# Patient Record
Sex: FEMALE | Race: WHITE | NOT HISPANIC OR LATINO | URBAN - METROPOLITAN AREA
[De-identification: names, ages, dates, MRNs, and addresses within clinical notes are randomized per-mention and may not be internally consistent; named-entity substitution may affect disease eponyms.]

---

## 2020-06-09 ENCOUNTER — OFFICE VISIT (OUTPATIENT)
Dept: URBAN - METROPOLITAN AREA CLINIC 13 | Facility: CLINIC | Age: 30
End: 2020-06-09

## 2020-06-12 ENCOUNTER — OFFICE VISIT (OUTPATIENT)
Dept: URBAN - METROPOLITAN AREA CLINIC 113 | Facility: CLINIC | Age: 30
End: 2020-06-12

## 2020-06-25 ENCOUNTER — OFFICE VISIT (OUTPATIENT)
Dept: URBAN - METROPOLITAN AREA CLINIC 113 | Facility: CLINIC | Age: 30
End: 2020-06-25

## 2020-07-25 ENCOUNTER — TELEPHONE ENCOUNTER (OUTPATIENT)
Dept: URBAN - METROPOLITAN AREA CLINIC 13 | Facility: CLINIC | Age: 30
End: 2020-07-25

## 2020-07-25 RX ORDER — DICYCLOMINE HYDROCHLORIDE 20 MG/1
TAKE 1 TABLET EVERY 6 HOURS PRN ABDOMINAL PAIN TABLET ORAL
Qty: 45 | Refills: 6 | OUTPATIENT
Start: 2020-05-06 | End: 2020-06-25

## 2020-07-25 RX ORDER — SODIUM SULFATE, POTASSIUM SULFATE, MAGNESIUM SULFATE 17.5; 3.13; 1.6 G/ML; G/ML; G/ML
TAKE 1 BOTTLE AT 5:00PM THE DAY BEFORE PROCEDURE. TAKE 2ND BOTTLE 6 HRS PRIOR TO PROCEDURE SOLUTION, CONCENTRATE ORAL
Qty: 1 | Refills: 0 | OUTPATIENT
Start: 2020-05-18 | End: 2020-06-25

## 2020-07-26 ENCOUNTER — TELEPHONE ENCOUNTER (OUTPATIENT)
Dept: URBAN - METROPOLITAN AREA CLINIC 13 | Facility: CLINIC | Age: 30
End: 2020-07-26

## 2020-07-26 RX ORDER — MELOXICAM 15 MG/1
TK 1 T PO QD WC TABLET ORAL
Qty: 30 | Refills: 0 | Status: ACTIVE | COMMUNITY
Start: 2019-11-19

## 2020-07-26 RX ORDER — METHOCARBAMOL 750 MG/1
TK 1 T PO TID TABLET, FILM COATED ORAL
Qty: 60 | Refills: 0 | Status: ACTIVE | COMMUNITY
Start: 2019-08-30

## 2020-07-26 RX ORDER — METHYLPREDNISOLONE 4 MG/1
ALTERNATE PACKS UTD TAT TABLET ORAL
Qty: 42 | Refills: 0 | Status: ACTIVE | COMMUNITY
Start: 2019-07-10

## 2020-07-26 RX ORDER — SODIUM FLUORIDE AND POTASSIUM NITRATE 5.8; 57.5 MG/ML; MG/ML
BRUSH UTD GEL, DENTIFRICE DENTAL
Qty: 100 | Refills: 0 | Status: ACTIVE | COMMUNITY
Start: 2020-06-02

## 2021-06-11 ENCOUNTER — TELEPHONE ENCOUNTER (OUTPATIENT)
Dept: URBAN - METROPOLITAN AREA CLINIC 107 | Facility: CLINIC | Age: 31
End: 2021-06-11

## 2023-03-09 ENCOUNTER — TELEPHONE ENCOUNTER (OUTPATIENT)
Dept: URBAN - METROPOLITAN AREA CLINIC 113 | Facility: CLINIC | Age: 33
End: 2023-03-09

## 2023-04-06 ENCOUNTER — LAB OUTSIDE AN ENCOUNTER (OUTPATIENT)
Dept: URBAN - METROPOLITAN AREA CLINIC 113 | Facility: CLINIC | Age: 33
End: 2023-04-06

## 2023-04-06 ENCOUNTER — DASHBOARD ENCOUNTERS (OUTPATIENT)
Age: 33
End: 2023-04-06

## 2023-04-06 ENCOUNTER — OFFICE VISIT (OUTPATIENT)
Dept: URBAN - METROPOLITAN AREA CLINIC 113 | Facility: CLINIC | Age: 33
End: 2023-04-06
Payer: COMMERCIAL

## 2023-04-06 VITALS
HEIGHT: 65 IN | RESPIRATION RATE: 18 BRPM | BODY MASS INDEX: 23.49 KG/M2 | SYSTOLIC BLOOD PRESSURE: 102 MMHG | HEART RATE: 69 BPM | DIASTOLIC BLOOD PRESSURE: 70 MMHG | WEIGHT: 141 LBS | TEMPERATURE: 97.5 F

## 2023-04-06 DIAGNOSIS — R17 TOTAL BILIRUBIN, ELEVATED: ICD-10-CM

## 2023-04-06 DIAGNOSIS — K59.1 DIARRHEA, FUNCTIONAL: ICD-10-CM

## 2023-04-06 PROCEDURE — 99214 OFFICE O/P EST MOD 30 MIN: CPT | Performed by: NURSE PRACTITIONER

## 2023-04-06 RX ORDER — METHOCARBAMOL 750 MG/1
TK 1 T PO TID TABLET, FILM COATED ORAL
Qty: 60 | Refills: 0 | Status: ON HOLD | COMMUNITY
Start: 2019-08-30

## 2023-04-06 RX ORDER — MELOXICAM 15 MG/1
TK 1 T PO QD WC TABLET ORAL
Qty: 30 | Refills: 0 | Status: ACTIVE | COMMUNITY
Start: 2019-11-19

## 2023-04-06 RX ORDER — METHYLPREDNISOLONE 4 MG/1
ALTERNATE PACKS UTD TAT TABLET ORAL
Qty: 42 | Refills: 0 | Status: ON HOLD | COMMUNITY
Start: 2019-07-10

## 2023-04-06 RX ORDER — SODIUM FLUORIDE AND POTASSIUM NITRATE 5.8; 57.5 MG/ML; MG/ML
BRUSH UTD GEL, DENTIFRICE DENTAL
Qty: 100 | Refills: 0 | Status: ON HOLD | COMMUNITY
Start: 2020-06-02

## 2023-04-06 NOTE — HPI-TODAY'S VISIT:
Ms. Sanchez is a 30-year-old female with a history of abdominal pain and and diarrhea presenting for evaluation of an elevated bilirubin. She was seen in the office in June 2020 for follow-up of LLQ abdominal pain and diarrhea, improved with dietary modification. She was to resume psyllium husk and utilize dicyclomine when needed. Recent colonoscopy with random biopsies was unremarkable. Overall, she is doing fairly well from a GI standpoint. She has occasional bouts of diarrhea, but manages this with various supplements and diet. She avoids gluten and most dairy. She typically has 3 nonbloody stools per day. No abdominal pain, nausea or vomiting. She has experienced abnormal vaginal bleeding and will discuss this with her GYN. Her daily supplements consist of MCT oil, psyllium husk, joint support (glucosamine), green tree extract, vitamin B complex, calcium, magnesium, iron. She has not been found deficient in any of these vitamins, but self-supplements due to various symptoms. The primary reason for today's visit regards a several year persistent elevation in her bilirubin. She provides labs from 2021 and 2022 showing a Tbili from 1.5-1.7. Her AST, ALT and ALP are normal, as are her hemoglobin and platelets.

## 2023-04-06 NOTE — HPI-OTHER HISTORIES
Colonoscopy performed 6/9/20 notable for BBPS 9, mild to moderate sigmoid diverticulosis with muscular hypertrophy, mild patchy erythema, prominent sigmoid colonic spasm, mild patchy rectal erythema likely prep effect status post biopsy, normal terminal ileum status post biopsy, and random colon biopsies. Terminal ileal biopsy demonstrated small intestinal mucosa with no pathologic diagnosis. Biopsies from the rectum demonstrated colonic mucosal tissue with few prominent lymphoid aggregates; negative for colitis. Random biopsies demonstrated colonic mucosal tissue with no pathologic diagnosis. CT the abdomen and pelvis with contrast 5/12/20: Possible minimal thickening of the wall of the sigmoid colon. Differential diagnosis includes pseudo-thickening secondary to under distention and mild colitis. 1.6 cm subserosal fibroid in the uterine fundus. Small volume simple appearing free fluid in the pelvis likely physiologic. Otherwise unremarkable exam of the abdomen and pelvis.

## 2023-04-27 ENCOUNTER — TELEPHONE ENCOUNTER (OUTPATIENT)
Dept: URBAN - METROPOLITAN AREA CLINIC 113 | Facility: CLINIC | Age: 33
End: 2023-04-27

## 2023-04-27 RX ORDER — DICYCLOMINE HYDROCHLORIDE 20 MG/1
1 TABLET TABLET ORAL
Qty: 45 TABLETS | Refills: 1 | OUTPATIENT
Start: 2023-04-28 | End: 2023-06-27

## 2023-04-27 RX ORDER — CIPROFLOXACIN 500 MG/1
1 TABLET TABLET, FILM COATED ORAL TWICE A DAY
Qty: 28 TABLET | Refills: 0 | OUTPATIENT
Start: 2023-04-28 | End: 2023-05-12

## 2023-04-27 RX ORDER — METRONIDAZOLE 500 MG/1
1 TABLET TABLET ORAL THREE TIMES A DAY
Qty: 42 TABLET | Refills: 0 | OUTPATIENT
Start: 2023-04-28 | End: 2023-05-12

## 2023-06-26 ENCOUNTER — TELEPHONE ENCOUNTER (OUTPATIENT)
Dept: URBAN - METROPOLITAN AREA CLINIC 113 | Facility: CLINIC | Age: 33
End: 2023-06-26

## 2023-10-03 ENCOUNTER — OFFICE VISIT (OUTPATIENT)
Dept: URBAN - METROPOLITAN AREA CLINIC 113 | Facility: CLINIC | Age: 33
End: 2023-10-03

## 2023-11-07 ENCOUNTER — OFFICE VISIT (OUTPATIENT)
Dept: URBAN - METROPOLITAN AREA CLINIC 113 | Facility: CLINIC | Age: 33
End: 2023-11-07